# Patient Record
Sex: MALE | Race: WHITE | Employment: STUDENT | URBAN - METROPOLITAN AREA
[De-identification: names, ages, dates, MRNs, and addresses within clinical notes are randomized per-mention and may not be internally consistent; named-entity substitution may affect disease eponyms.]

---

## 2017-06-06 ENCOUNTER — HOSPITAL ENCOUNTER (EMERGENCY)
Age: 22
Discharge: HOME | End: 2017-06-06
Attending: EMERGENCY MEDICINE
Payer: COMMERCIAL

## 2017-06-06 VITALS
TEMPERATURE: 97.2 F | DIASTOLIC BLOOD PRESSURE: 54 MMHG | SYSTOLIC BLOOD PRESSURE: 106 MMHG | OXYGEN SATURATION: 99 % | WEIGHT: 138.01 LBS | HEART RATE: 60 BPM | RESPIRATION RATE: 16 BRPM

## 2017-06-06 DIAGNOSIS — B34.9 VIRAL SYNDROME: Primary | ICD-10-CM

## 2017-06-06 LAB
ALBUMIN SERPL BCP-MCNC: 4 G/DL (ref 3.5–5)
ALBUMIN/GLOB SERPL: 1.2 {RATIO} (ref 1.1–2.2)
ALP SERPL-CCNC: 58 U/L (ref 45–117)
ALT SERPL-CCNC: 19 U/L (ref 12–78)
ANION GAP BLD CALC-SCNC: 9 MMOL/L (ref 5–15)
APPEARANCE UR: CLEAR
AST SERPL W P-5'-P-CCNC: 15 U/L (ref 15–37)
BACTERIA URNS QL MICRO: NEGATIVE /HPF
BILIRUB SERPL-MCNC: 1.4 MG/DL (ref 0.2–1)
BILIRUB UR QL: NEGATIVE
BUN SERPL-MCNC: 10 MG/DL (ref 6–20)
BUN/CREAT SERPL: 9 (ref 12–20)
CALCIUM SERPL-MCNC: 8.5 MG/DL (ref 8.5–10.1)
CHLORIDE SERPL-SCNC: 100 MMOL/L (ref 97–108)
CO2 SERPL-SCNC: 27 MMOL/L (ref 21–32)
COLOR UR: ABNORMAL
CREAT SERPL-MCNC: 1.1 MG/DL (ref 0.7–1.3)
EPITH CASTS URNS QL MICRO: ABNORMAL /LPF
GLOBULIN SER CALC-MCNC: 3.3 G/DL (ref 2–4)
GLUCOSE SERPL-MCNC: 100 MG/DL (ref 65–100)
GLUCOSE UR STRIP.AUTO-MCNC: NEGATIVE MG/DL
HETEROPH AB SER QL: NEGATIVE
HGB UR QL STRIP: NEGATIVE
KETONES UR QL STRIP.AUTO: ABNORMAL MG/DL
LEUKOCYTE ESTERASE UR QL STRIP.AUTO: NEGATIVE
NITRITE UR QL STRIP.AUTO: NEGATIVE
PH UR STRIP: 6.5 [PH] (ref 5–8)
POTASSIUM SERPL-SCNC: 3.5 MMOL/L (ref 3.5–5.1)
PROT SERPL-MCNC: 7.3 G/DL (ref 6.4–8.2)
PROT UR STRIP-MCNC: 30 MG/DL
RBC #/AREA URNS HPF: ABNORMAL /HPF (ref 0–5)
SODIUM SERPL-SCNC: 136 MMOL/L (ref 136–145)
SP GR UR REFRACTOMETRY: 1.02 (ref 1–1.03)
UROBILINOGEN UR QL STRIP.AUTO: 1 EU/DL (ref 0.2–1)
WBC URNS QL MICRO: ABNORMAL /HPF (ref 0–4)

## 2017-06-06 PROCEDURE — 81001 URINALYSIS AUTO W/SCOPE: CPT | Performed by: EMERGENCY MEDICINE

## 2017-06-06 PROCEDURE — 80053 COMPREHEN METABOLIC PANEL: CPT | Performed by: EMERGENCY MEDICINE

## 2017-06-06 PROCEDURE — 96361 HYDRATE IV INFUSION ADD-ON: CPT

## 2017-06-06 PROCEDURE — 99283 EMERGENCY DEPT VISIT LOW MDM: CPT

## 2017-06-06 PROCEDURE — 74011250636 HC RX REV CODE- 250/636: Performed by: EMERGENCY MEDICINE

## 2017-06-06 PROCEDURE — 85025 COMPLETE CBC W/AUTO DIFF WBC: CPT | Performed by: EMERGENCY MEDICINE

## 2017-06-06 PROCEDURE — 86664 EPSTEIN-BARR NUCLEAR ANTIGEN: CPT | Performed by: EMERGENCY MEDICINE

## 2017-06-06 PROCEDURE — 96360 HYDRATION IV INFUSION INIT: CPT

## 2017-06-06 PROCEDURE — 74011250637 HC RX REV CODE- 250/637: Performed by: EMERGENCY MEDICINE

## 2017-06-06 PROCEDURE — 86308 HETEROPHILE ANTIBODY SCREEN: CPT | Performed by: EMERGENCY MEDICINE

## 2017-06-06 PROCEDURE — 87070 CULTURE OTHR SPECIMN AEROBIC: CPT | Performed by: EMERGENCY MEDICINE

## 2017-06-06 PROCEDURE — 36415 COLL VENOUS BLD VENIPUNCTURE: CPT | Performed by: EMERGENCY MEDICINE

## 2017-06-06 RX ORDER — DEXTROAMPHETAMINE SACCHARATE, AMPHETAMINE ASPARTATE, DEXTROAMPHETAMINE SULFATE AND AMPHETAMINE SULFATE 2.5; 2.5; 2.5; 2.5 MG/1; MG/1; MG/1; MG/1
10 TABLET ORAL
COMMUNITY

## 2017-06-06 RX ORDER — ACETAMINOPHEN 325 MG/1
650 TABLET ORAL
Status: COMPLETED | OUTPATIENT
Start: 2017-06-06 | End: 2017-06-06

## 2017-06-06 RX ORDER — IBUPROFEN 600 MG/1
600 TABLET ORAL
Status: COMPLETED | OUTPATIENT
Start: 2017-06-06 | End: 2017-06-06

## 2017-06-06 RX ADMIN — IBUPROFEN 600 MG: 600 TABLET, FILM COATED ORAL at 15:43

## 2017-06-06 RX ADMIN — SODIUM CHLORIDE 1000 ML: 900 INJECTION, SOLUTION INTRAVENOUS at 16:41

## 2017-06-06 RX ADMIN — ACETAMINOPHEN 650 MG: 325 TABLET, FILM COATED ORAL at 16:41

## 2017-06-06 NOTE — DISCHARGE INSTRUCTIONS
Viral Infections in Teens: Care Instructions  Your Care Instructions  You don't feel well, but it's not clear what's causing it. You may have a viral infection. Viruses cause many illnesses, such as the common cold, influenza, fever, rashes, and the diarrhea, nausea, and vomiting that are often called \"stomach flu. \" You may wonder if antibiotic medicines could make you feel better. But antibiotics only treat infections caused by bacteria. They don't work on viruses. The good news is that viral infections usually aren't serious. Most will go away in a few days without medical treatment. In the meantime, there are a few things you can do to make yourself more comfortable. Follow-up care is a key part of your treatment and safety. Be sure to make and go to all appointments, and call your doctor if you are having problems. It's also a good idea to know your test results and keep a list of the medicines you take. How can you care for yourself at home? · Get plenty of rest if you feel tired. · Take an over-the-counter pain medicine if needed, such as acetaminophen (Tylenol), ibuprofen (Advil, Motrin), or naproxen (Aleve). Read and follow all instructions on the label. No one younger than 20 should take aspirin. It has been linked to Reye syndrome, a serious illness. · Be careful when taking over-the-counter cold or flu medicines and Tylenol at the same time. Many of these medicines have acetaminophen, which is Tylenol. Read the labels to make sure that you are not taking more than the recommended dose. Too much acetaminophen (Tylenol) can be harmful. · Drink plenty of fluids, enough so that your urine is light yellow or clear like water. If you have kidney, heart, or liver disease and have to limit fluids, talk with your doctor before you increase the amount of fluids you drink. · Stay home from school, work, and other public places while you have a fever. When should you call for help?   Call 911 anytime you think you may need emergency care. For example, call if:  · You have severe trouble breathing. · You passed out (lost consciousness). Call your doctor now or seek immediate medical care if:  · You seem to be getting much sicker. · You have a new or higher fever. · You have blood in your stools. · You have new belly pain, or your pain gets worse. · You have a new rash. Watch closely for changes in your health, and be sure to contact your doctor if:  · You start to get better and then get worse. · You do not get better as expected. Where can you learn more? Go to http://raymond-shyanne.info/. Enter W498 in the search box to learn more about \"Viral Infections in Teens: Care Instructions. \"  Current as of: May 24, 2016  Content Version: 11.2  © 6572-3797 [employer]. Care instructions adapted under license by [employer] (which disclaims liability or warranty for this information). If you have questions about a medical condition or this instruction, always ask your healthcare professional. [address] any warranty or liability for your use of this information. Fever in Teens: Care Instructions  Your Care Instructions  A fever is a high body temperature. A fever is one way your body fights illness. A temperature of up to 102°F can be helpful, because it helps the body respond to infection. Most healthy teens can tolerate a fever as high as 103°F to 104°F for short periods of time without problems. In most cases, a fever means you have a minor illness. Follow-up care is a key part of your treatment and safety. Be sure to make and go to all appointments, and call your doctor if you are having problems. It's also a good idea to know your test results and keep a list of the medicines you take. How can you care for yourself at home? · Drink plenty of fluids (enough so that your urine is light yellow or clear like water) to prevent dehydration. Choose water and other caffeine-free clear liquids. If you have to limit fluids because of a health problem, talk with your doctor before you increase the amount of fluids you drink. · Take an over-the-counter medicine, such as acetaminophen (Tylenol), ibuprofen (Advil, Motrin) or naproxen (Aleve), to relieve your symptoms. Read and follow all instructions on the label. No one younger than 20 should take aspirin. It has been linked to Reye syndrome, a serious illness. · Take a sponge bath with lukewarm water if a fever causes discomfort. · Dress lightly. · Eat light foods, such as soup. When should you call for help? Call your doctor now or seek immediate medical care if:  · You have a fever of 104°F or higher. · You have a fever that stays high. · You have a fever and feel confused or often feel dizzy. · You have trouble breathing. · You have a fever with a stiff neck or a severe headache. Watch closely for changes in your health, and be sure to contact your doctor if:  · You do not get better as expected. · You have any problems with your medicine, or you get a fever after starting a new medicine. Where can you learn more? Go to http://raymond-shyanne.info/. Enter X884 in the search box to learn more about \"Fever in Teens: Care Instructions. \"  Current as of: May 27, 2016  Content Version: 11.2  © 8691-9081 [employer]. Care instructions adapted under license by [employer] (which disclaims liability or warranty for this information). If you have questions about a medical condition or this instruction, always ask your healthcare professional. [address] any warranty or liability for your use of this information.

## 2017-06-06 NOTE — LETTER
[address] 
[address] [facility] DEPT 
[address] [place] [address] [address] [facility] [id number] 
[phone] Work/School Note Date: 6/6/2017 To Whom It May concern: 
 
Mary Giron was seen and treated today in the emergency room by the following provider(s): 
Attending Provider: Dorothea Ojeda MD.   
 
Mary Giron can return to work 24 hours after his last fever. Sincerely, Dorothea Ojeda MD

## 2017-06-06 NOTE — ED PROVIDER NOTES
HPI Comments: 25 y.o. male with past medical history significant for ADD and seasonal allergies who presents accompanied by mother and girlfriend with chief complaint of headache. Pt reports that 2 days ago he began to experience generalized weakness, joint pain, and a fever of 102. At onset of sx, he took Advil and Midol with some relief. Pt then woke yesterday with additional sx of a diffuse HA, sore throat, chills, diaphoresis, \"dark urine,\" and dysuria. He took Kaia seltzer at that time and felt much better. However, pt woke this morning with a worsened HA and painful anterior cervical lymph nodes, so he presented to [facility] to be evaluated. While there, he had a strep test performed (negative) and was referred to the ED to r/o meningitis. Pt rated his HA as 8/10 at that time. He now rates it as 7/10. Pt notes that he vomited x 1 today. He also mentions that he experienced some SOB yesterday, but none today. Pt's sister was recently sick with flu-like sx (3-4 days ago). No recent illnesses. No h/o hospital admissions. No surgical hx. Pertinent negatives include no photophobia, syncope, vision changes, hearing loss, CP, palpitations, wheezing, rhinorrhea, congestion, hematuria, or abd pain. There are no other acute medical concerns at this time. Social hx: denies tobacco use or illicit drug use; lives at home with parents; [facility]; sexually active; works as   Significant FMHx: adopted, unsure of hx  PCP: Steven Mcelroy MD  Regular medications: Adderall and Vyvanse     Note written by Zack Guillen. [place], as dictated by Beau Barbour MD 3:59 PM      The history is provided by the patient and a significant other. No  was used. Past Medical History:   Diagnosis Date    HX OTHER MEDICAL     ADD       Past Surgical History:   Procedure Laterality Date    HX ORTHOPAEDIC      right wrist, left elbowm, right collarbone         History reviewed.  No pertinent family history. Social History     Social History    Marital status: SINGLE     Spouse name: N/A    Number of children: N/A    Years of education: N/A     Occupational History    Not on file. Social History Main Topics    Smoking status: Never Smoker    Smokeless tobacco: Not on file    Alcohol use No    Drug use: Not on file    Sexual activity: Not on file     Other Topics Concern    Not on file     Social History Narrative         ALLERGIES: Review of patient's allergies indicates no known allergies. Review of Systems   Constitutional: Positive for appetite change (decreased), chills, diaphoresis and fever. HENT: Positive for sore throat. Negative for congestion, ear pain, hearing loss and rhinorrhea. Eyes: Negative for photophobia and visual disturbance. Respiratory: Positive for cough and shortness of breath. Negative for wheezing. Cardiovascular: Negative for chest pain and palpitations. Gastrointestinal: Positive for nausea and vomiting. Negative for abdominal pain. Genitourinary: Positive for dysuria. Negative for hematuria. Musculoskeletal: Positive for arthralgias. Negative for neck stiffness. + joint stiffness   Skin: Negative for rash. Neurological: Positive for headaches. Negative for syncope. All other systems reviewed and are negative. Vitals:    06/06/17 1527 06/06/17 1533   BP:  122/60   Pulse:  (!) 112   Resp:  20   Temp: 100.3 °F (37.9 °C) 100.3 °F (37.9 °C)   SpO2:  98%   Weight: 62.6 kg (138 lb 0.1 oz)             Physical Exam   Constitutional: He is oriented to person, place, and time. He appears well-developed and well-nourished. Tired, no meningismus- full flexion wihtotu pain   HENT:   Head: Normocephalic and atraumatic. Mouth/Throat: Oropharyngeal exudate present. Pharyngeal erythema   Eyes: Conjunctivae are normal. No scleral icterus. Neck: Normal range of motion. Neck supple.    Cardiovascular: Normal rate, regular rhythm and normal heart sounds. Pulmonary/Chest: Effort normal and breath sounds normal. No respiratory distress. Abdominal: Soft. Bowel sounds are normal. He exhibits no distension. There is no tenderness. There is no rebound and no guarding. Musculoskeletal: Normal range of motion. Lymphadenopathy:     He has no cervical adenopathy. Neurological: He is alert and oriented to person, place, and time. No cranial nerve deficit. Psychiatric: He has a normal mood and affect. His behavior is normal.   Nursing note and vitals reviewed. MDM  Number of Diagnoses or Management Options  Viral syndrome:   Diagnosis management comments: 26 yo improved viral symptoms- feeling muhc better. Labs reassuring. Monospot negative. Taking po's. No focal bacterial process on exam- will treat conservatively= return with any worsening of symptoms.         Amount and/or Complexity of Data Reviewed  Clinical lab tests: ordered and reviewed  Obtain history from someone other than the patient: yes    Risk of Complications, Morbidity, and/or Mortality  Presenting problems: moderate  Management options: moderate    Patient Progress  Patient progress: improved    ED Course       Procedures

## 2017-06-06 NOTE — ED TRIAGE NOTES
Triage note: Sunday patient started with weakness, Monday fever up to 102-103, headache and neck stiffness started mid afternoon, today seen at [facility] for decreased fever but body aches increased headaches and neck pain. Referred to ED.

## 2017-06-06 NOTE — LETTER
[address] 
[address] [facility] DEPT 
[address] [place] [address] [facility] [id number] 
[phone] Work/School Note Date: 6/6/2017 To Whom It May concern: 
 
Rose Marie Hyman was seen and treated today in the emergency room by the following provider(s): 
Attending Provider: Mary Womack MD.   
 
Rose Marie Hyman may return to work once he is  afevebrile 24 hours. Sincerely, Lelo Del Angel RN

## 2017-06-07 LAB
BASOPHILS # BLD AUTO: 0 K/UL (ref 0–0.1)
BASOPHILS # BLD: 0 % (ref 0–1)
DIFFERENTIAL METHOD BLD: ABNORMAL
EOSINOPHIL # BLD: 0 K/UL (ref 0–0.4)
EOSINOPHIL NFR BLD: 0 % (ref 0–7)
ERYTHROCYTE [DISTWIDTH] IN BLOOD BY AUTOMATED COUNT: 11.9 % (ref 11.5–14.5)
HCT VFR BLD AUTO: 37.5 % (ref 36.6–50.3)
HGB BLD-MCNC: 13.2 G/DL (ref 12.1–17)
LYMPHOCYTES # BLD AUTO: 8 % (ref 12–49)
LYMPHOCYTES # BLD: 1.2 K/UL (ref 0.8–3.5)
MCH RBC QN AUTO: 30.9 PG (ref 26–34)
MCHC RBC AUTO-ENTMCNC: 35.2 G/DL (ref 30–36.5)
MCV RBC AUTO: 87.8 FL (ref 80–99)
MONOCYTES # BLD: 2.1 K/UL (ref 0–1)
MONOCYTES NFR BLD AUTO: 14 % (ref 5–13)
NEUTS BAND NFR BLD MANUAL: 1 % (ref 0–6)
NEUTS SEG # BLD: 11.6 K/UL (ref 1.8–8)
NEUTS SEG NFR BLD AUTO: 77 % (ref 32–75)
PATH REV BLD -IMP: ABNORMAL
PLATELET # BLD AUTO: 148 K/UL (ref 150–400)
PLATELET COMMENTS,PCOM: ABNORMAL
RBC # BLD AUTO: 4.27 M/UL (ref 4.1–5.7)
RBC MORPH BLD: ABNORMAL
WBC # BLD AUTO: 14.9 K/UL (ref 4.1–11.1)
WBC MORPH BLD: ABNORMAL

## 2017-06-08 LAB
BACTERIA SPEC CULT: NORMAL
EBV EA IGG SER-ACNC: <9 U/ML (ref 0–8.9)
EBV NA IGG SER-ACNC: 522 U/ML (ref 0–17.9)
EBV VCA IGG SER-ACNC: 74.9 U/ML (ref 0–17.9)
EBV VCA IGM SER-ACNC: <36 U/ML (ref 0–35.9)
INTERPRETATION, 169995: ABNORMAL
SERVICE CMNT-IMP: NORMAL